# Patient Record
Sex: FEMALE | Race: WHITE | NOT HISPANIC OR LATINO | URBAN - NONMETROPOLITAN AREA
[De-identification: names, ages, dates, MRNs, and addresses within clinical notes are randomized per-mention and may not be internally consistent; named-entity substitution may affect disease eponyms.]

---

## 2024-11-14 ENCOUNTER — OFFICE VISIT (OUTPATIENT)
Dept: FAMILY MEDICINE | Facility: CLINIC | Age: 54
End: 2024-11-14
Payer: COMMERCIAL

## 2024-11-14 VITALS
TEMPERATURE: 98 F | DIASTOLIC BLOOD PRESSURE: 89 MMHG | OXYGEN SATURATION: 97 % | BODY MASS INDEX: 42.59 KG/M2 | WEIGHT: 265 LBS | HEIGHT: 66 IN | SYSTOLIC BLOOD PRESSURE: 138 MMHG | RESPIRATION RATE: 18 BRPM

## 2024-11-14 DIAGNOSIS — J32.9 SINUSITIS, UNSPECIFIED CHRONICITY, UNSPECIFIED LOCATION: Primary | ICD-10-CM

## 2024-11-14 DIAGNOSIS — Z01.818 PREOP EXAMINATION: ICD-10-CM

## 2024-11-14 RX ORDER — CEFTRIAXONE 1 G/1
1 INJECTION, POWDER, FOR SOLUTION INTRAMUSCULAR; INTRAVENOUS
Status: COMPLETED | OUTPATIENT
Start: 2024-11-14 | End: 2024-11-14

## 2024-11-14 RX ORDER — METHYLPREDNISOLONE ACETATE 40 MG/ML
40 INJECTION, SUSPENSION INTRA-ARTICULAR; INTRALESIONAL; INTRAMUSCULAR; SOFT TISSUE
Status: COMPLETED | OUTPATIENT
Start: 2024-11-14 | End: 2024-11-14

## 2024-11-14 RX ORDER — DEXAMETHASONE SODIUM PHOSPHATE 4 MG/ML
4 INJECTION, SOLUTION INTRA-ARTICULAR; INTRALESIONAL; INTRAMUSCULAR; INTRAVENOUS; SOFT TISSUE
Status: DISCONTINUED | OUTPATIENT
Start: 2024-11-14 | End: 2024-11-14

## 2024-11-14 RX ORDER — AZITHROMYCIN 250 MG/1
TABLET, FILM COATED ORAL
Qty: 6 TABLET | Refills: 0 | Status: SHIPPED | OUTPATIENT
Start: 2024-11-14

## 2024-11-14 RX ADMIN — METHYLPREDNISOLONE ACETATE 40 MG: 40 INJECTION, SUSPENSION INTRA-ARTICULAR; INTRALESIONAL; INTRAMUSCULAR; SOFT TISSUE at 08:11

## 2024-11-14 RX ADMIN — CEFTRIAXONE 1 G: 1 INJECTION, POWDER, FOR SOLUTION INTRAMUSCULAR; INTRAVENOUS at 08:11

## 2024-11-14 NOTE — ASSESSMENT & PLAN NOTE
-Rocephin 1GM IM  -Methylprednisolone 40mg IM  -Z-pack complete until the antibiotics are all gone, even if you start to feel better.   -Flonase Daily  -Zertec Daily  -Use Abi-pot at home.  -Increase fluid intake.   -Return to clinic if s/s persist or do not improve.   -Go to local ER if shortness of breath or chest pain occur.

## 2024-11-14 NOTE — LETTER
11/14/2024               [facility]  [address] [address] [place] [address]  Phone: [phone]  Fax: [phone]   11/14/2024    Patient: Gini Hewitt   YOB: 1970   Date of Visit: 11/14/2024       To Whom it May Concern:    Gini Hewitt was seen in my clinic on 11/14/2024. She had labs done in October and according to those labs and her physical examination today on 11/14/2024 she is cleared for bariatric surgery. She will be cleared by a cardiologist as well. She will greatly benefit from this surgery. .    If you have any questions or concerns, please don't hesitate to call.    Sincerely,         Kamila Parikh, FNP

## 2024-11-14 NOTE — ASSESSMENT & PLAN NOTE
She is cleared for surgery according to her labs from October and her physical exam today. She will greatly benefit from this procedure. RTC as needed.

## 2024-11-14 NOTE — PROGRESS NOTES
HPI:   Gini Hewitt is a pleasant 54 y.o. patient who reports to clinic with complaints of surgery clearance today. She also complains of head pressure in her frontal sinus cavities for 7 days. She states she is having nasal congestion and cough for about the same amount of time. She has taken over the counter vics day time cold and flu and zyrtec but she states she can't seem to get any relieve. She has missed two days of zyrtec last week and she feels like that is when it got worse. She denies any other issues. She denies any shortness of breath or chest pain.  She is having a gastric sleeve in December. She is seeing Dr. Keenan Mistry in [place] at [facility] [place], operated by [facility]. She had a normal physical exam. She had to have a form signed by her PCP and then see a cardiologist to be cleared cardio. She denies any shortness of breath or chest pain. She had lab work done a month ago and they all look good and surgery appropriate. According to labs form October and physical exam today she is cleared for surgery after following up with Cardiology for clearance as well. She has no significant past medical history. She denies any issues in the past. She takes no daily medications. I think she would benefit greatly from the gastric sleeve.         Sinusitis  This is a new problem. The current episode started in the past 7 days. The problem has been gradually worsening since onset. There has been no fever. The pain is moderate. Associated symptoms include congestion and sinus pressure. Pertinent negatives include no chills or headaches. Past treatments include oral decongestants. The treatment provided mild relief.                History reviewed. No pertinent past medical history.    PAST SURGICAL HISTORY:   Past Surgical History:   Procedure Laterality Date    ANKLE SURGERY Right        MEDICATIONS:    Current Outpatient Medications:     azithromycin (Z-CL) 250 MG tablet, Take 2 tablets by mouth on day 1; Take 1 tablet by mouth on days  2-5, Disp: 6 tablet, Rfl: 0  No current facility-administered medications for this visit.    ALLERGIES:   Review of patient's allergies indicates:   Allergen Reactions    Penicillins Rash         Review of Systems   Constitutional: Negative.  Negative for activity change, chills and fever.   HENT:  Positive for nasal congestion, rhinorrhea and sinus pressure/congestion. Negative for drooling and nosebleeds.    Eyes: Negative.    Respiratory:  Negative for chest tightness.    Cardiovascular: Negative.  Negative for chest pain and palpitations.   Gastrointestinal: Negative.    Endocrine: Negative.    Genitourinary: Negative.  Negative for difficulty urinating.   Musculoskeletal: Negative.    Integumentary:  Negative.   Allergic/Immunologic: Negative.    Neurological: Negative.  Negative for dizziness and headaches.   Hematological: Negative.    Psychiatric/Behavioral: Negative.     All other systems reviewed and are negative.         Physical Exam  Constitutional:       General: She is not in acute distress.     Appearance: Normal appearance. She is well-developed. She is obese. She is not ill-appearing.   HENT:      Head: Normocephalic and atraumatic.      Right Ear: Tympanic membrane normal.      Left Ear: Tympanic membrane normal.      Nose: Nose normal.      Mouth/Throat:      Mouth: Mucous membranes are moist.      Pharynx: Oropharynx is clear. No posterior oropharyngeal erythema.   Cardiovascular:      Rate and Rhythm: Normal rate and regular rhythm.      Pulses: Normal pulses.      Heart sounds: Normal heart sounds.   Pulmonary:      Effort: Pulmonary effort is normal. No accessory muscle usage or respiratory distress.      Breath sounds: Normal breath sounds.   Abdominal:      General: Abdomen is flat. Bowel sounds are normal. There is no distension.      Palpations: Abdomen is soft.      Tenderness: There is no abdominal tenderness.   Musculoskeletal:         General: Normal range of motion.      Cervical  "back: Normal range of motion.   Skin:     General: Skin is warm and dry.      Capillary Refill: Capillary refill takes less than 2 seconds.   Neurological:      Mental Status: She is alert and oriented to person, place, and time. Mental status is at baseline.   Psychiatric:         Mood and Affect: Mood normal.         Speech: Speech normal.         Behavior: Behavior normal. Behavior is cooperative.         Thought Content: Thought content normal.          VITAL SIGNS:   /89 (BP Location: Left arm, Patient Position: Sitting)   Temp 97.5 °F (36.4 °C) (Oral)   Resp 18   Ht 5' 5.5" (1.664 m)   Wt 120.2 kg (265 lb)   SpO2 97%   BMI 43.43 kg/m²       ASSESSMENT/PLAN  1. Sinusitis, unspecified chronicity, unspecified location  Assessment & Plan:  -Rocephin 1GM IM  -Methylprednisolone 40mg IM  -Z-pack complete until the antibiotics are all gone, even if you start to feel better.   -Flonase Daily  -Zertec Daily  -Use Abi-pot at home.  -Increase fluid intake.   -Return to clinic if s/s persist or do not improve.   -Go to local ER if shortness of breath or chest pain occur.       Orders:  -     cefTRIAXone injection 1 g  -     Discontinue: dexAMETHasone injection 4 mg  -     azithromycin (Z-CL) 250 MG tablet; Take 2 tablets by mouth on day 1; Take 1 tablet by mouth on days 2-5  Dispense: 6 tablet; Refill: 0  -     methylPREDNISolone acetate injection 40 mg    2. Preop examination  Assessment & Plan:  She is cleared for surgery according to her labs from October and her physical exam today. She will greatly benefit from this procedure. RTC as needed.                There are no Patient Instructions on file for this visit.  No orders of the defined types were placed in this encounter.          "